# Patient Record
Sex: FEMALE | Race: ASIAN | Employment: OTHER | URBAN - METROPOLITAN AREA
[De-identification: names, ages, dates, MRNs, and addresses within clinical notes are randomized per-mention and may not be internally consistent; named-entity substitution may affect disease eponyms.]

---

## 2019-05-24 ENCOUNTER — RECORDS - HEALTHEAST (OUTPATIENT)
Dept: LAB | Facility: CLINIC | Age: 74
End: 2019-05-24

## 2019-05-29 LAB
GAMMA INTERFERON BACKGROUND BLD IA-ACNC: 0.06 IU/ML
M TB IFN-G BLD-IMP: NEGATIVE
MITOGEN IGNF BCKGRD COR BLD-ACNC: 0.09 IU/ML
MITOGEN IGNF BCKGRD COR BLD-ACNC: 0.2 IU/ML
QTF INTERPRETATION: NORMAL
QTF MITOGEN - NIL: 7.12 IU/ML

## 2019-08-05 LAB — MAMMOGRAM: NORMAL

## 2020-03-02 ENCOUNTER — TRANSFERRED RECORDS (OUTPATIENT)
Dept: HEALTH INFORMATION MANAGEMENT | Facility: CLINIC | Age: 75
End: 2020-03-02

## 2020-12-01 ENCOUNTER — MEDICAL CORRESPONDENCE (OUTPATIENT)
Dept: HEALTH INFORMATION MANAGEMENT | Facility: CLINIC | Age: 75
End: 2020-12-01

## 2020-12-02 ENCOUNTER — TELEPHONE (OUTPATIENT)
Dept: OTOLARYNGOLOGY | Facility: CLINIC | Age: 75
End: 2020-12-02

## 2020-12-02 ENCOUNTER — TRANSCRIBE ORDERS (OUTPATIENT)
Dept: OTHER | Age: 75
End: 2020-12-02

## 2020-12-02 DIAGNOSIS — H81.03 MENIERE'S DISEASE OF BOTH EARS: Primary | ICD-10-CM

## 2020-12-02 DIAGNOSIS — H93.13 TINNITUS AURIUM, BILATERAL: ICD-10-CM

## 2020-12-02 NOTE — TELEPHONE ENCOUNTER
[facility] Call Center    Phone Message    May a detailed message be left on voicemail: no     Reason for Call: Appointment Intake    Referring Provider Name: Petty Mckoy at [facility]  Diagnosis and/or Symptoms: Meniere's disease of both ears, Tinnitus aurium, bilateral     Action Taken: Message routed to:  [facility] ([facility]): [facility]INIC [facility]-ENT-EYE-DENTAL [[id number]]    Travel Screening: Not Applicable

## 2020-12-08 ENCOUNTER — TELEPHONE (OUTPATIENT)
Dept: OTOLARYNGOLOGY | Facility: CLINIC | Age: 75
End: 2020-12-08

## 2020-12-09 ENCOUNTER — TELEPHONE (OUTPATIENT)
Dept: OTOLARYNGOLOGY | Facility: CLINIC | Age: 75
End: 2020-12-09

## 2020-12-09 NOTE — TELEPHONE ENCOUNTER
FUTURE VISIT INFORMATION      FUTURE VISIT INFORMATION:    Date: 2/16/2021    Time: 10AM    Location: [facility]  REFERRAL INFORMATION:    Referring provider:  Petty Mckoy PA-C      Referring providers clinic:  [facility]      Reason for visit/diagnosis  Vertigo- Referred by Petty Mckoy at [facility]    RECORDS REQUESTED FROM:       Clinic name Comments Records Status Imaging Status   [facility]  10/13/2015 note from Dr nissen   10/13/2015 audiogram from Baylee Parr [facility]   12/7/2020 note from Petty Mckoy PA-C   Care Everywhere    [facility]  03/02/2020 note from Nasima Vasquez PA-C   Care Everywhere     [facility]  03/02/2020 audiogram from Vanita Ramey, MS, CCC-A    req 12/9  Received 12/14 12/9/2020 11:52AM sent a fax to [facility] for audiogram - Amay   12/14/2020 8:24AM received audiogram from [facility], sent a delayed message to audiology to review - Amay

## 2020-12-09 NOTE — TELEPHONE ENCOUNTER
1. Have you noticed any changes in hearing? No  2. Do you have ringing, buzzing, or other sounds in your ears or head, this is also referred to as Tinnitus? Yes  3. When and where was your last hearing test? [facility] 2015  4. Do you feel lightheaded or foggy? Yes  5. Do you have a spinning sensation? Yes  6. Is there any specific position that can bring on dizziness? random  7. Does looking up cause dizziness? No  8. Does getting in and our of bed cause dizziness? No  9. Does turning over in bed increase or cause dizziness? No  10. Does bending over cause dizziness? No  11. Is there anything that you can do to prevent the dizziness? Sitting down and bending head forward  12. Has the dizziness gotten better with time? Yes  13. Have you seen Physical Therapy for dizziness? (Please indicate clinic and as much of the location as possible): Yes, If yes, where? [facility]  if yes, who?   14. Are you being referred to a specific physician? No  15. Have you been evaluated/treated for your dizziness at any other location?  (If yes,obtian as much clinic/provider/locaiton as possible) Yes. (If yes answer the following questions:)   Have you seen any ENT, Neurology, or other providers for these symptoms?             Yes, If yes, where? Dr. Nissen 2015, [facility] if yes, who?    Have you had any balance or Audiology testing? No Have you had an MRI or CT scan of your head or neck? Yes, If yes, where? [facility] 2012 if yes, who?     Would you like to receive your Release of Information by mail or e-mail?  mail

## 2020-12-14 DIAGNOSIS — R42 DIZZINESS: Primary | ICD-10-CM

## 2020-12-14 NOTE — TELEPHONE ENCOUNTER
Requesting orders for hearing test, VNG and EcochG testing prior to patient's appt with Dr. Nissen on 2/16/2021. *Pt will require in-person [facility]  for appointments.     Per record review:  -Referred for vertigo. Vertigo seems to have re-onset in Nov 2020. Previous dx of Right Meniere's disease. Reports bilateral tinnitus.    -Previously seen by Dr. Rick Nissen and prior to this Dr. Corrigan. Saw Dr. Nissen on 9/22/2015 & 10/13/2015 where she was prescribed Benadryl along with the Aldactone and she underwent a transtympanic steroid injection. Following this, she reported improved hearing and a resolution of her vertigo episodes.     -Most recent hearing test available to review was performed here on 10/13/2015 and revealed normal to mild to moderate SNHL in LE and moderate rising to normal sloping to mild to moderate SNHL in RE (an improvement from her previous audio 9/22/15).     -No previous balance testing.     Cara Cali. CCC-A  Licensed Audiologist   [place] #[id number]

## 2020-12-29 ENCOUNTER — TELEPHONE (OUTPATIENT)
Dept: AUDIOLOGY | Facility: CLINIC | Age: 75
End: 2020-12-29

## 2021-01-17 ENCOUNTER — DOCUMENTATION ONLY (OUTPATIENT)
Dept: CARE COORDINATION | Facility: CLINIC | Age: 76
End: 2021-01-17

## 2021-02-16 ENCOUNTER — PRE VISIT (OUTPATIENT)
Dept: OTOLARYNGOLOGY | Facility: CLINIC | Age: 76
End: 2021-02-16

## 2021-10-26 ENCOUNTER — OFFICE VISIT (OUTPATIENT)
Dept: OPHTHALMOLOGY | Facility: CLINIC | Age: 76
End: 2021-10-26
Payer: COMMERCIAL

## 2021-10-26 DIAGNOSIS — Z96.1 PSEUDOPHAKIA OF BOTH EYES: ICD-10-CM

## 2021-10-26 DIAGNOSIS — H02.886 MEIBOMIAN GLAND DYSFUNCTION (MGD) OF BOTH EYES: ICD-10-CM

## 2021-10-26 DIAGNOSIS — H02.883 MEIBOMIAN GLAND DYSFUNCTION (MGD) OF BOTH EYES: ICD-10-CM

## 2021-10-26 DIAGNOSIS — H04.123 DRY EYE SYNDROME, BILATERAL: Primary | ICD-10-CM

## 2021-10-26 PROCEDURE — 92002 INTRM OPH EXAM NEW PATIENT: CPT | Performed by: STUDENT IN AN ORGANIZED HEALTH CARE EDUCATION/TRAINING PROGRAM

## 2021-10-26 RX ORDER — METFORMIN HCL 500 MG
500 TABLET, EXTENDED RELEASE 24 HR ORAL DAILY
COMMUNITY
Start: 2021-09-15

## 2021-10-26 RX ORDER — ATORVASTATIN CALCIUM 40 MG/1
40 TABLET, FILM COATED ORAL DAILY
COMMUNITY
Start: 2021-04-26

## 2021-10-26 RX ORDER — PANTOPRAZOLE SODIUM 20 MG/1
TABLET, DELAYED RELEASE ORAL DAILY
COMMUNITY
Start: 2020-11-09

## 2021-10-26 RX ORDER — CARBOXYMETHYLCELLULOSE SODIUM 10 MG/ML
1 GEL OPHTHALMIC 4 TIMES DAILY
Qty: 15 ML | Refills: 11 | Status: SHIPPED | OUTPATIENT
Start: 2021-10-26

## 2021-10-26 RX ORDER — AMLODIPINE BESYLATE 2.5 MG/1
2.5 TABLET ORAL DAILY
COMMUNITY
Start: 2021-09-09

## 2021-10-26 ASSESSMENT — TONOMETRY
IOP_METHOD: APPLANATION
OS_IOP_MMHG: 20
OD_IOP_MMHG: 20

## 2021-10-26 ASSESSMENT — EXTERNAL EXAM - RIGHT EYE: OD_EXAM: NORMAL

## 2021-10-26 ASSESSMENT — SLIT LAMP EXAM - LIDS
COMMENTS: 1+ DERMATOCHALASIS, 1+ MEIBOMIAN GLAND DYSFUNCTION
COMMENTS: 1+ DERMATOCHALASIS, 1+ MEIBOMIAN GLAND DYSFUNCTION

## 2021-10-26 ASSESSMENT — VISUAL ACUITY
CORRECTION_TYPE: GLASSES
OS_SC: 20/40
METHOD: NUMBERS - LINEAR
OD_PH_SC: 20/30
OD_SC: 20/100

## 2021-10-26 ASSESSMENT — EXTERNAL EXAM - LEFT EYE: OS_EXAM: NORMAL

## 2021-10-26 NOTE — PATIENT INSTRUCTIONS
Switch from artificial tears to gel tears 3 times a day and at bedtime (like Refresh Liquigel or GenTeal Gel Tears)     Zuhair Orr MD  [phone]

## 2021-10-26 NOTE — PROGRESS NOTES
" Current Eye Medications:  Refresh three times a day both eyes.     Subjective:  Burning, dry, and tightness both eyes for last 4-5 months. Patient states \"Eyes feel dry in between taking drops, only helps temporally\". Vision has been OK both eyes.  history of cataract surgery in [place].     Objective:  See Ophthalmology Exam.      Assessment:  Love Lucas is a 76 year old female who presents with:   Encounter Diagnoses   Name Primary?     Dry eye syndrome, bilateral      Meibomian gland dysfunction (MGD) of both eyes      Pseudophakia of both eyes        Plan:  Switch from artificial tears to gel tears 3 times a day and at bedtime (like Refresh Liquigel or GenTeal Gel Tears)     Zuhair Orr MD  [phone]                 "

## 2021-10-26 NOTE — LETTER
"    10/26/2021         RE: Love Lucas  [address]  [facility] [address]        Dear Colleague,    Thank you for referring your patient, Love Lucas, to the [facility]. Please see a copy of my visit note below.     Current Eye Medications:  Refresh three times a day both eyes.     Subjective:  Burning, dry, and tightness both eyes for last 4-5 months. Patient states \"Eyes feel dry in between taking drops, only helps temporally\". Vision has been OK both eyes.  history of cataract surgery in [place].     Objective:  See Ophthalmology Exam.      Assessment:  Love Lucas is a 76 year old female who presents with:   Encounter Diagnoses   Name Primary?     Dry eye syndrome, bilateral      Meibomian gland dysfunction (MGD) of both eyes      Pseudophakia of both eyes        Plan:  Switch from artificial tears to gel tears 3 times a day and at bedtime (like Refresh Liquigel or GenTeal Gel Tears)     Zuhair Orr MD  [phone]                     Again, thank you for allowing me to participate in the care of your patient.        Sincerely,        Zuhair Orr MD    "

## 2022-01-13 ENCOUNTER — IMMUNIZATION (OUTPATIENT)
Dept: NURSING | Facility: CLINIC | Age: 77
End: 2022-01-13
Payer: COMMERCIAL

## 2022-01-13 DIAGNOSIS — Z23 HIGH PRIORITY FOR 2019-NCOV VACCINE: Primary | ICD-10-CM

## 2022-01-13 PROCEDURE — 91305 COVID-19,PF,PFIZER (12+ YRS): CPT

## 2022-01-13 PROCEDURE — 99207 PR NO CHARGE LOS: CPT

## 2022-01-13 PROCEDURE — 0054A COVID-19,PF,PFIZER (12+ YRS): CPT

## 2023-01-15 ENCOUNTER — HEALTH MAINTENANCE LETTER (OUTPATIENT)
Age: 78
End: 2023-01-15

## 2024-02-17 ENCOUNTER — HEALTH MAINTENANCE LETTER (OUTPATIENT)
Age: 79
End: 2024-02-17

## 2024-06-13 ENCOUNTER — TRANSCRIBE ORDERS (OUTPATIENT)
Dept: OTHER | Age: 79
End: 2024-06-13

## 2024-06-13 DIAGNOSIS — H81.03 MENIERE'S DISEASE OF BOTH EARS: Primary | ICD-10-CM
